# Patient Record
(demographics unavailable — no encounter records)

---

## 2025-01-20 NOTE — ASSESSMENT
[FreeTextEntry1] : The condition was explained to the patient.   Discussed that arthritis is a progressive degenerative process, and symptoms may have a waxing/waning course, which may be exacerbated by activity or trauma. Discussed treatment options - activity modification, bracing, steroid injection, or last resort surgery. Discussed increased propensity for stiffness due to underlying arthritis.  - medical management of gout per Rheumatologist.  F/u PRN.

## 2025-01-20 NOTE — HISTORY OF PRESENT ILLNESS
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 Reports that he went to [facility] on 12/4/24 => XR report indicates arthritis, labs showed elevated uric acid (records not available for review). Reports that he took Colchicine on the advice on his general surgery friend, with improvement in symptoms, but then he had 4 drinks, and woke up with worsening pain the next day. Reports that PCP referred him to Rheumatologist => injection into upper arm, PO steroids. Reports no improvement.  Last seen 2/29/24 for RIGHT wrist extensor tenosynovitis and LEFT trigger thumb. Reports that since last visit, RIGHT wrist pain resolved, and then he developed LEFT dorsal radial wrist pain, which has since resolved.   Hx: Hodgkin's lymphoma. Gout - not on medication. Sx: Tonsillectomy. [FreeTextEntry5] :  01/20/2025: 55-year-old male [occupation] presents with RT MF. He states it has been swollen for more than a month now. He cannot bend his finger. Suspects gout. Has report from [facility], and told pt there are crystals in it. They also tested his blood.

## 2025-01-20 NOTE — HISTORY OF PRESENT ILLNESS
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 [employer]) presents for RIGHT middle finger PIPJ pain and swelling x 1 month. Reports that he went to [facility] on 12/4/24 => XR report indicates arthritis, labs showed elevated uric acid (records not available for review). Reports that he took Colchicine on the advice on his general surgery friend, with improvement in symptoms, but then he had 4 drinks, and woke up with worsening pain the next day. Reports that PCP referred him to Rheumatologist => injection into upper arm, PO steroids. Reports no improvement.  Last seen 2/29/24 for RIGHT wrist extensor tenosynovitis and LEFT trigger thumb. Reports that since last visit, RIGHT wrist pain resolved, and then he developed LEFT dorsal radial wrist pain, which has since resolved.   Hx: Hodgkin's lymphoma. Gout - not on medication. Sx: Tonsillectomy. [FreeTextEntry5] :  01/20/2025: 55-year-old male [occupation] presents with RT MF. He states it has been swollen for more than a month now. He cannot bend his finger. Suspects gout. Has report from [facility], and told pt there are crystals in it. They also tested his blood.

## 2025-01-20 NOTE — IMAGING
[de-identified] : RIGHT HAND skin intact. moderate swelling around MF PIPJ. no TTP. MF: held in extension, very limited flexion. good EPL, FPL. other fingers good extension, flex almost to mid-palm. good finger abduction and adduction. SILT to median, ulnar, radial distribution. palpable radial pulse, brisk cap refill all digits. no triggering.  XRAYS OF RIGHT MIDDLE FINGER (3 views - PA, OBLIQUE, AND LATERAL VIEWS): no acute displaced fracture or dislocation. severe djd of PIPJ. mild djd of DIPJ and 3rd MPJ.

## 2025-01-20 NOTE — IMAGING
[de-identified] : RIGHT HAND skin intact. moderate swelling around MF PIPJ. no TTP. MF: held in extension, very limited flexion. good EPL, FPL. other fingers good extension, flex almost to mid-palm. good finger abduction and adduction. SILT to median, ulnar, radial distribution. palpable radial pulse, brisk cap refill all digits. no triggering.  XRAYS OF RIGHT MIDDLE FINGER (3 views - PA, OBLIQUE, AND LATERAL VIEWS): no acute displaced fracture or dislocation. severe djd of PIPJ. mild djd of DIPJ and 3rd MPJ.